# Patient Record
Sex: FEMALE | Race: OTHER | NOT HISPANIC OR LATINO | URBAN - METROPOLITAN AREA
[De-identification: names, ages, dates, MRNs, and addresses within clinical notes are randomized per-mention and may not be internally consistent; named-entity substitution may affect disease eponyms.]

---
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
## 2025-06-23 NOTE — ED PROVIDER NOTE - CROS ED GI ALL NEG
Spoke to gil and conveyed message below, they verbalized understanding.        negative - no vomiting, no diarrhea